# Patient Record
Sex: MALE | Race: WHITE | NOT HISPANIC OR LATINO | Employment: FULL TIME | URBAN - METROPOLITAN AREA
[De-identification: names, ages, dates, MRNs, and addresses within clinical notes are randomized per-mention and may not be internally consistent; named-entity substitution may affect disease eponyms.]

---

## 2017-03-12 ENCOUNTER — HOSPITAL ENCOUNTER (EMERGENCY)
Facility: OTHER | Age: 32
Discharge: HOME | End: 2017-03-12
Attending: EMERGENCY MEDICINE
Payer: COMMERCIAL

## 2017-03-12 VITALS
DIASTOLIC BLOOD PRESSURE: 67 MMHG | SYSTOLIC BLOOD PRESSURE: 113 MMHG | BODY MASS INDEX: 23.8 KG/M2 | TEMPERATURE: 98 F | WEIGHT: 170 LBS | HEIGHT: 71 IN | OXYGEN SATURATION: 97 % | HEART RATE: 82 BPM | RESPIRATION RATE: 12 BRPM

## 2017-03-12 DIAGNOSIS — R19.7 NAUSEA VOMITING AND DIARRHEA: Primary | ICD-10-CM

## 2017-03-12 DIAGNOSIS — R11.2 NAUSEA VOMITING AND DIARRHEA: Primary | ICD-10-CM

## 2017-03-12 PROCEDURE — 63600175 PHARM REV CODE 636 W HCPCS: Performed by: EMERGENCY MEDICINE

## 2017-03-12 PROCEDURE — 96374 THER/PROPH/DIAG INJ IV PUSH: CPT

## 2017-03-12 PROCEDURE — 96361 HYDRATE IV INFUSION ADD-ON: CPT

## 2017-03-12 PROCEDURE — 96375 TX/PRO/DX INJ NEW DRUG ADDON: CPT

## 2017-03-12 PROCEDURE — 25000003 PHARM REV CODE 250: Performed by: EMERGENCY MEDICINE

## 2017-03-12 PROCEDURE — 99284 EMERGENCY DEPT VISIT MOD MDM: CPT | Mod: 25

## 2017-03-12 RX ORDER — ONDANSETRON 4 MG/1
4 TABLET, ORALLY DISINTEGRATING ORAL EVERY 6 HOURS PRN
Qty: 12 TABLET | Refills: 0 | Status: SHIPPED | OUTPATIENT
Start: 2017-03-12

## 2017-03-12 RX ORDER — FAMOTIDINE 10 MG/ML
20 INJECTION INTRAVENOUS
Status: COMPLETED | OUTPATIENT
Start: 2017-03-12 | End: 2017-03-12

## 2017-03-12 RX ORDER — ONDANSETRON 2 MG/ML
4 INJECTION INTRAMUSCULAR; INTRAVENOUS
Status: COMPLETED | OUTPATIENT
Start: 2017-03-12 | End: 2017-03-12

## 2017-03-12 RX ADMIN — SODIUM CHLORIDE 1000 ML: 0.9 INJECTION, SOLUTION INTRAVENOUS at 08:03

## 2017-03-12 RX ADMIN — FAMOTIDINE 20 MG: 10 INJECTION INTRAVENOUS at 08:03

## 2017-03-12 RX ADMIN — ONDANSETRON 4 MG: 2 INJECTION, SOLUTION INTRAMUSCULAR; INTRAVENOUS at 08:03

## 2017-03-12 NOTE — ED AVS SNAPSHOT
[facility]  [address]  [facility] [address]               Adrian Ori   3/12/2017  7:17 AM   ED    Description:  Male : 1985   Department:  [facility]           Your Care was Coordinated By:     Provider Role From To    Bita Wagoner MD Attending Provider 17 0719 --      Reason for Visit     Abdominal Pain           Diagnoses this Visit        Comments    Nausea vomiting and diarrhea    -  Primary       ED Disposition     None           To Do List           Follow-up Information     Schedule an appointment as soon as possible for a visit with Cherie.    Specialties:  Behavioral Health, Psychiatry    Why:  As needed - or your regular primary doctor if you have one    Contact information:    [address]  [facility] [address]  [phone]          Follow up with [facility].    Specialty:  Emergency Medicine    Why:  As needed, If symptoms worsen    Contact information:    [address]  [facility] [address] [phone]       These Medications        Disp Refills Start End    ondansetron (ZOFRAN-ODT) 4 MG TbDL 12 tablet 0 3/12/2017     Take 1 tablet (4 mg total) by mouth every 6 (six) hours as needed (nausea). - Oral      [facility]     [facility] -  Assistance  Registered nurses in the [facility] provide clinical advisement, health education, appointment booking, and other advisory services.  Call for this free service at [phone].             Medications           Message regarding Medications     Verify the changes and/or additions to your medication regime listed below are the same as discussed with your clinician today.  If any of these changes or additions are incorrect, please notify your healthcare provider.        START taking these NEW medications        Refills    ondansetron (ZOFRAN-ODT) 4 MG TbDL 0    Sig: Take 1  "tablet (4 mg total) by mouth every 6 (six) hours as needed (nausea).    Class: Print    Route: Oral      These medications were administered today        Dose Freq    sodium chloride 0.9% bolus 1,000 mL 1,000 mL Once    Sig: Inject 1,000 mLs into the vein once.    Class: Normal    Route: Intravenous    ondansetron injection 4 mg 4 mg ED 1 Time    Sig: Inject 4 mg into the vein ED 1 Time.    Class: Normal    Route: Intravenous    famotidine (PF) 20 mg/2 mL injection 20 mg 20 mg ED 1 Time    Sig: Inject 2 mLs (20 mg total) into the vein ED 1 Time.    Class: Normal    Route: Intravenous           Verify that the below list of medications is an accurate representation of the medications you are currently taking.  If none reported, the list may be blank. If incorrect, please contact your healthcare provider. Carry this list with you in case of emergency.           Current Medications     ondansetron (ZOFRAN-ODT) 4 MG TbDL Take 1 tablet (4 mg total) by mouth every 6 (six) hours as needed (nausea).           Clinical Reference Information           Your Vitals Were     BP Pulse Temp Resp Height Weight    113/67 82 98.3 °F (36.8 °C) (Oral) 12 5' 11" (1.803 m) 77.1 kg (170 lb)    SpO2 BMI             97% 23.71 kg/m2         Allergies as of 3/12/2017        Reactions    Pcn [Penicillins] Hives    As child      Immunizations Administered on Date of Encounter - 3/12/2017     None      ED Micro, Lab, POCT     None      ED Imaging Orders     None      Discharge References/Attachments     VOMITING AND DIARRHEA, NONSPECIFIC (ADULT) (ENGLISH)    VOMITING AND DIARRHEA, SELF-CARE FOR (ENGLISH)       [facility] complies with applicable Federal civil rights laws and does not discriminate on the basis of race, color, national origin, age, disability, or sex.        Language Assistance Services     ATTENTION: Language assistance services are available, free of charge. Please call [phone].      ATENCIÓN: Si deisy " español, tiene a walls disposición servicios gratuitos de asistencia lingüística. Llame al [phone].     MARJ Ý: N?u b?n nói Ti?ng Vi?t, có các d?ch v? h? tr? ngôn ng? mi?n phí dành cho b?n. G?i s? [phone].

## 2017-03-12 NOTE — ED NOTES
"Pt reports eating bluebell ice cream last night, and started w/ nausea/vomiting at 10pm. Pt reports "throwing up all throughout the night," w/ stomach cramping. Currently pt denies nausea, reports mild cramping, states able to hold a sip of water down this AM. Pt states working at the [employer] last night, and his captain advised him to come in to get "checked out." Pt is AAO x 3, answers questions appropriately  "

## 2017-03-12 NOTE — ED PROVIDER NOTES
"Encounter Date: 3/12/2017    SCRIBE #1 NOTE: I, aMriel Marti, am scribing for, and in the presence of,  Dr. Wagoner. I have scribed the entire note.       History     Chief Complaint   Patient presents with    Abdominal Pain     Patient c/o generalized abdominal pain, nausea,vomiting & diarrhea since yesterday.  Patient stated, "I ate some bluebell ice cream and I think that was it b/c it was only my coworker and I that ate it and we both have the same symptoms."     Review of patient's allergies indicates:   Allergen Reactions    Pcn [penicillins] Hives     As child     HPI Comments: Time seen by provider: 7:42 AM    This is a 31 y.o. male who presents with complaint of nausea, vomiting, diarrhea and generalized abdominal pain. He reports onset of symptoms was about 9 hrs ago. The patient states the symptoms began a few hours after eating ice cream. He notes his coworker who also ate the ice cream felt bad as well but did not have any vomiting or diarrhea. The patient describes the pain as cramping and intermittent, mostly preceding vomiting episodes. He states there is associated nausea, vomiting and diarrhea but denies any constipation, urinary symptoms, fever or chills. The patient does note he had a few blood streaks in vomit but no blood present in diarrhea. He also reports a coworker noted he felt hot but did not check his temperature.    The history is provided by the patient.     History reviewed. No pertinent past medical history. no diabetes or hypertension.  History reviewed. No pertinent surgical history.  History reviewed. No pertinent family history.  Social History   Substance Use Topics    Smoking status: Never Smoker    Smokeless tobacco: None    Alcohol use Yes     Review of Systems   Constitutional: Negative for chills and fever.   HENT: Negative for congestion and sore throat.    Eyes: Negative for redness and visual disturbance.   Respiratory: Negative for cough and shortness of breath. "    Cardiovascular: Negative for chest pain and palpitations.   Gastrointestinal: Positive for abdominal pain, diarrhea, nausea and vomiting.   Genitourinary: Negative for dysuria.   Musculoskeletal: Negative for back pain.   Skin: Negative for rash.   Neurological: Negative for weakness and headaches.   Psychiatric/Behavioral: Negative for confusion.       Physical Exam   Initial Vitals   BP Pulse Resp Temp SpO2   03/12/17 0715 03/12/17 0715 03/12/17 0715 03/12/17 0715 03/12/17 0715   99/63 114 12 98.3 °F (36.8 °C) 97 %     Vitals:    03/12/17 0727 03/12/17 0738 03/12/17 0755 03/12/17 0825   BP:   121/69 112/67   Pulse: 100 96 96 86   Resp:       Temp:       TempSrc:       SpO2: 96% 96% 95% 96%   Weight:       Height:        03/12/17 0855   BP: 113/67   Pulse: 82   Resp:    Temp:    TempSrc:    SpO2: 97%   Weight:    Height:        Physical Exam    Nursing note and vitals reviewed.  Constitutional: He appears well-developed and well-nourished. He is not diaphoretic. No distress.   HENT:   Head: Normocephalic and atraumatic.   Right Ear: External ear normal.   Left Ear: External ear normal.   Eyes: Conjunctivae and EOM are normal. Pupils are equal, round, and reactive to light.   Neck: Normal range of motion. Neck supple.   Cardiovascular: Regular rhythm and normal heart sounds. Exam reveals no gallop and no friction rub.    No murmur heard.  Mild tachycardia   Pulmonary/Chest: Breath sounds normal. He has no wheezes. He has no rhonchi. He has no rales.   Abdominal: Soft. There is no tenderness. There is no rebound and no guarding.   Hyperactive bowel sounds   Musculoskeletal: Normal range of motion. He exhibits no edema or tenderness.   Lymphadenopathy:     He has no cervical adenopathy.   Neurological: He is alert and oriented to person, place, and time. He has normal strength.   Skin: Skin is warm and dry. No rash noted.         ED Course   Procedures  Labs Reviewed - No data to display          Medical Decision  Making:   ED Management:  9:06 AM Patient states he is feeling better, no further nausea or vomiting  Emergent evaluation a 31-year-old male with complaint of abdominal discomfort with associated vomiting and diarrhea since last night.  Initial vital signs revealed mild tachycardia, this had resolved by time of my exam.  There was no abdominal tenderness on exam and I doubt acute appendicitis or cholecystitis.  He was treated with IV fluids, Zofran and Pepcid with improvement in symptoms.  There was no further nausea or vomiting during ER course.  He is discharged in improved condition with prescription for Zofran, and I encouraged close follow-up with PCP.  I also advised him to return here for any new or worsening symptoms.  I do not think antibiotics are indicated at this time, nor do I think there is a Mayra-Allison tear or Boerhaave syndrome based on exam.            Scribe Attestation:   Scribe #1: I performed the above scribed service and the documentation accurately describes the services I performed. I attest to the accuracy of the note.    Attending Attestation:           Physician Attestation for Scribe:  Physician Attestation Statement for Scribe #1: I, Dr. Wagoner, reviewed documentation, as scribed by Mariel Marti in my presence, and it is both accurate and complete.                 ED Course     Clinical Impression:     1. Nausea vomiting and diarrhea                Bita Wagoner MD  03/12/17 0986

## 2020-05-25 ENCOUNTER — OFFICE VISIT (OUTPATIENT)
Dept: URGENT CARE | Facility: CLINIC | Age: 35
End: 2020-05-25
Payer: OTHER MISCELLANEOUS

## 2020-05-25 VITALS
HEIGHT: 71 IN | HEART RATE: 74 BPM | OXYGEN SATURATION: 99 % | RESPIRATION RATE: 20 BRPM | WEIGHT: 170 LBS | TEMPERATURE: 99 F | BODY MASS INDEX: 23.8 KG/M2

## 2020-05-25 DIAGNOSIS — S83.91XA SPRAIN OF RIGHT KNEE, UNSPECIFIED LIGAMENT, INITIAL ENCOUNTER: Primary | ICD-10-CM
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
## 2020-05-25 PROCEDURE — 99203 PR OFFICE/OUTPT VISIT, NEW, LEVL III, 30-44 MIN: ICD-10-PCS | Mod: S$GLB,,, | Performed by: PHYSICIAN ASSISTANT

## 2020-05-25 PROCEDURE — 73562 X-RAY EXAM OF KNEE 3: CPT | Mod: RT,S$GLB,, | Performed by: RADIOLOGY

## 2020-05-25 PROCEDURE — 73562 XR KNEE 3 VIEW RIGHT: ICD-10-PCS | Mod: RT,S$GLB,, | Performed by: RADIOLOGY

## 2020-05-25 PROCEDURE — 99203 OFFICE O/P NEW LOW 30 MIN: CPT | Mod: S$GLB,,, | Performed by: PHYSICIAN ASSISTANT

## 2020-05-25 RX ORDER — IBUPROFEN 600 MG/1
600 TABLET ORAL EVERY 6 HOURS PRN
Qty: 30 TABLET | Refills: 1 | Status: SHIPPED | OUTPATIENT
Start: 2020-05-25 | End: 2020-06-24

## 2020-05-25 NOTE — PROGRESS NOTES
"Subjective:       Patient ID: Adrian Rehman is a 34 y.o. male.    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   He did continue to work for the rest the shift.  Since then he has had intermittent pain in the right knee.  He states the pain is mostly in the medial and posterior aspect of the knee.  Patient states he feels as if the knee "locks up".  He says he did feel like it would give out today but he shifted is weight to his of leg before that happened.  Patient states he did have an injury to this knee few years ago require physical therapy but no surgery.    Knee Injury   This is a new problem. The current episode started in the past 7 days. The problem occurs constantly. The problem has been gradually worsening. Associated symptoms include numbness. Pertinent negatives include no abdominal pain, anorexia, arthralgias, change in bowel habit, chest pain, chills, congestion, coughing, diaphoresis, fatigue, fever, headaches, joint swelling, myalgias, nausea, neck pain, rash, sore throat, swollen glands or vertigo. The symptoms are aggravated by bending, standing and walking. He has tried ice and immobilization for the symptoms. The treatment provided no relief.       Constitution: Negative for chills, sweating, fatigue and fever.   HENT: Negative for facial swelling, facial trauma, congestion and sore throat.    Neck: Negative for neck pain and neck stiffness.   Cardiovascular: Negative for chest trauma and chest pain.   Eyes: Negative for eye trauma, double vision and blurred vision.   Respiratory: Negative for cough.    Gastrointestinal: Negative for abdominal trauma, abdominal pain, nausea and rectal bleeding.   Genitourinary: Negative for hematuria, genital trauma and pelvic pain.   Musculoskeletal: Negative for pain, trauma, joint pain, joint swelling, abnormal ROM of joint, pain with " walking and muscle ache.   Skin: Negative for color change, rash, wound, abrasion, laceration and erythema.   Neurological: Positive for numbness. Negative for dizziness, history of vertigo, light-headedness, coordination disturbances, headaches, altered mental status and loss of consciousness.   Hematologic/Lymphatic: Negative for history of bleeding disorder.   Psychiatric/Behavioral: Negative for altered mental status.        Objective:      Physical Exam   Constitutional: He is oriented to person, place, and time. He appears well-developed and well-nourished. No distress.   HENT:   Head: Normocephalic and atraumatic.   Eyes: Conjunctivae are normal.   Neck: Normal range of motion. Neck supple.   Cardiovascular: Normal rate and regular rhythm. Exam reveals no gallop and no friction rub.   No murmur heard.  Pulmonary/Chest: Effort normal and breath sounds normal. He has no wheezes. He has no rales.   Musculoskeletal:        Right knee: He exhibits decreased range of motion and swelling. Tenderness found. Medial joint line tenderness noted. No lateral joint line, no MCL, no LCL and no patellar tendon tenderness noted.   Right knee:  He is able to extend the knee fully.  He is able to flex to approximately 90°.   Negative anterior posterior drawer test.  Negative valgus and varus stress test.   Neurological: He is alert and oriented to person, place, and time.   Skin: Skin is warm and dry. No rash noted. No erythema.   Psychiatric: He has a normal mood and affect. His behavior is normal. Judgment and thought content normal.   Nursing note and vitals reviewed.      Xr Knee 3 View Right    Result Date: 5/25/2020  EXAMINATION: XR KNEE 3 VIEW RIGHT CLINICAL HISTORY: Unspecified injury of right lower leg, initial encounter FINDINGS: Three views: No fracture dislocation bone destruction seen. Electronically signed by: Thom Cooper MD Date:    05/25/2020 Time:    16:37    Assessment:       1. Sprain of right knee,  unspecified ligament, initial encounter    2. Work related injury        Plan:         Medications Ordered This Encounter   Medications    ibuprofen (ADVIL,MOTRIN) 600 MG tablet     Sig: Take 1 tablet (600 mg total) by mouth every 6 (six) hours as needed.     Dispense:  30 tablet     Refill:  1     Patient Instructions: Attention not to aggravate affected area, Apply ice 24-48 hours then apply heat/warm soaks, Elevated affected area(Take Tylenol as directed as needed for pain.  Take prescription ibuprofen as directed as needed for pain.)   Restrictions: Sit down work only  Follow up in about 1 week (around 6/1/2020).

## 2020-05-25 NOTE — PATIENT INSTRUCTIONS
Knee Sprain    A sprain is an injury to the ligaments or capsule that holds a joint together. There are no broken bones. Most sprains take 3 to 6 weeks to heal. If it a severe sprain where the ligament is completely torn, it can take months to recover.  Most knee sprains are treated with a splint, knee immobilizer brace, or elastic wrap for support. Severe sprains may require surgery.  Home care  · Stay off the injured leg as much as possible until you can walk on it without pain. If you have a lot of pain with walking, crutches or a walker may be prescribed. (These can be rented or purchased at many pharmacies and surgical or orthopedic supply stores). Follow your healthcare provider's advice about when to begin putting weight on that leg.  · Keep your leg elevated to reduce pain and swelling. When sleeping, place a pillow under the injured leg. When sitting, support the injured leg so it is level with your waist. This is very important during the first 48 hours.  · Apply an ice pack over the injured area for 15 to 20 minutes every 3 to 6 hours. You should do this for the first 24 to 48 hours. You can make an ice pack by filling a plastic bag that seals at the top with ice cubes and then wrapping it with a thin towel. Continue to use ice packs for relief of pain and swelling as needed. As the ice melts, be careful to avoid getting your wrap, splint, or cast wet. After 48 hours, apply heat (warm shower or warm bath) for 15 to 20 minutes several times a day, or alternate ice and heat. You can place the ice pack directly over the splint. If you have to wear a hook-and-loop knee brace, you can open it to apply the ice pack, or heat, directly to the knee. Never put ice directly on the skin. Always wrap the ice in a towel or other type of cloth.  · You may use over-the-counter pain medicine to control pain, unless another pain medicine was prescribed.If you have chronic liver or kidney disease or ever had a stomach  ulcer or GI bleeding, talk with your healthcare provider before using these medicines.  · If you were given a splint, keep it completely dry at all times. Bathe with your splint out of the water, protected with 2 large plastic bags, rubber-banded at the top end. If a fiberglass splint gets wet, you can dry it with a hair dryer. If you have a hook-and-loop knee brace, you can remove this to bathe, unless told otherwise.  Follow-up care  Follow up with your doctor as advised. Any X-rays you had today dont show any broken bones, breaks, or fractures. Sometimes fractures dont show up on the first X-ray. Bruises and sprains can sometimes hurt as much as a fracture. These injuries can take time to heal completely. If your symptoms dont improve or they get worse, talk with your doctor. You may need a repeat X-ray. If X-rays were taken, you will be told of any new findings that may affect your care.  Call 911  Call 911 if you have:  ·  Shortness of breath  ·  Chest pain  When to seek medical advice  Call your healthcare provider right away if any of these occur:  · The splint or knee immobilizer brace becomes wet or soft  · The fiberglass cast or splint remains wet for more than 24 hours  · Pain or swelling increases  · The injured leg or toes become cold, blue, numb, or tingly  Date Last Reviewed: 11/20/2015  © 7107-9514 [employer]. [address] [place] [address], [place] [address]. All rights reserved. This information is not intended as a substitute for professional medical care. Always follow your healthcare professional's instructions.

## 2020-05-25 NOTE — LETTER
[facility] [address] [address]  Phone: [phone]  Fax: [phone]  [facility]: [id number]    Pt Name: Adrian Rehman  Injury Date: 05/22/2020   Employee ID:  Date of First Treatment: 05/25/2020   Company: [email or web address] [facility] [employer]      Appointment Time: 03:55 PM Arrived: 3:55 PM   Provider: Trang Kay PA-C Time Out: 4:45 PM      Office Treatment:   1. Sprain of right knee, unspecified ligament, initial encounter    2. Work related injury          Patient Instructions: Attention not to aggravate affected area, Apply ice 24-48 hours then apply heat/warm soaks, Elevated affected area(Take Tylenol as directed as needed for pain.  Take prescription ibuprofen as directed as needed for pain.)    Restrictions: Sit down work only     Return Appointment: 6/1/2020 at 3:30 PM

## 2020-06-01 ENCOUNTER — OFFICE VISIT (OUTPATIENT)
Dept: URGENT CARE | Facility: CLINIC | Age: 35
End: 2020-06-01
Payer: OTHER MISCELLANEOUS

## 2020-06-01 VITALS
BODY MASS INDEX: 23.8 KG/M2 | RESPIRATION RATE: 18 BRPM | DIASTOLIC BLOOD PRESSURE: 84 MMHG | SYSTOLIC BLOOD PRESSURE: 120 MMHG | WEIGHT: 170 LBS | TEMPERATURE: 100 F | OXYGEN SATURATION: 97 % | HEART RATE: 88 BPM | HEIGHT: 71 IN
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
## 2020-06-01 DIAGNOSIS — S83.91XD SPRAIN OF RIGHT KNEE, UNSPECIFIED LIGAMENT, SUBSEQUENT ENCOUNTER: ICD-10-CM

## 2020-06-01 PROCEDURE — 99213 PR OFFICE/OUTPT VISIT, EST, LEVL III, 20-29 MIN: ICD-10-PCS | Mod: S$GLB,,, | Performed by: NURSE PRACTITIONER

## 2020-06-01 PROCEDURE — 99213 OFFICE O/P EST LOW 20 MIN: CPT | Mod: S$GLB,,, | Performed by: NURSE PRACTITIONER

## 2020-06-01 RX ORDER — DEXTROMETHORPHAN HYDROBROMIDE, GUAIFENESIN 5; 100 MG/5ML; MG/5ML
650 LIQUID ORAL EVERY 8 HOURS
Refills: 0 | COMMUNITY
Start: 2020-06-01

## 2020-06-01 NOTE — PROGRESS NOTES
"Subjective:       Patient ID: Adrian Rehman is a 34 y.o. male.    Vitals:  height is 5' 11" (1.803 m) and weight is 77.1 kg (170 lb). His temperature is 99.6 °F (37.6 °C). His blood pressure is 120/84 and his pulse is 88. His respiration is 18 and oxygen saturation is 97%.     Chief Complaint: Knee Pain    F/u on work injury. RT knee.  States that hurts with lateral movement and occasionally when going up and down the stairs in the medial aspect of the knee    Knee Pain    The incident occurred more than 1 week ago (10 days). The incident occurred at work. The injury mechanism is unknown. The pain is present in the right knee. The quality of the pain is described as aching. The pain is at a severity of 2/10. The pain is mild. The pain has been fluctuating since onset. The symptoms are aggravated by weight bearing.       Constitution: Negative for chills, fatigue and fever.   HENT: Negative for congestion and sore throat.    Neck: Negative for painful lymph nodes.   Cardiovascular: Negative for chest pain and leg swelling.   Eyes: Negative for double vision and blurred vision.   Respiratory: Negative for cough and shortness of breath.    Gastrointestinal: Negative for nausea, vomiting and diarrhea.   Genitourinary: Negative for dysuria, frequency and urgency.   Musculoskeletal: Positive for joint pain. Negative for joint swelling, muscle cramps and muscle ache.   Skin: Negative for color change, pale, rash and erythema.   Allergic/Immunologic: Negative for seasonal allergies.   Neurological: Negative for dizziness, history of vertigo, light-headedness, passing out and headaches.   Hematologic/Lymphatic: Negative for swollen lymph nodes, easy bruising/bleeding and history of blood clots. Does not bruise/bleed easily.   Psychiatric/Behavioral: Negative for nervous/anxious, sleep disturbance and depression. The patient is not nervous/anxious.        Objective:      Physical Exam   Constitutional: He is oriented to person, " place, and time. He appears well-developed and well-nourished. No distress.   HENT:   Head: Normocephalic and atraumatic.   Eyes: Conjunctivae are normal.   Neck: Normal range of motion. Neck supple.   Cardiovascular: Normal rate and regular rhythm. Exam reveals no gallop and no friction rub.   No murmur heard.  Pulmonary/Chest: Effort normal and breath sounds normal. He has no wheezes. He has no rales.   Musculoskeletal:        Right knee: He exhibits normal range of motion and no swelling. No tenderness found. No medial joint line, no lateral joint line, no MCL, no LCL and no patellar tendon tenderness noted.   Right knee:  He is able to extend the knee fully.  He is able to flex to approximately 90°.   Negative anterior posterior drawer test.  Negative valgus and varus stress test.   Neurological: He is alert and oriented to person, place, and time.   Skin: Skin is warm, dry and no rash. erythema  Psychiatric: He has a normal mood and affect. His behavior is normal. Judgment and thought content normal.   Nursing note and vitals reviewed.        Assessment:       1. Work related injury    2. Sprain of right knee, unspecified ligament, subsequent encounter        Plan:         Work related injury    Sprain of right knee, unspecified ligament, subsequent encounter    Other orders  -     acetaminophen (TYLENOL) 650 MG TbSR; Take 1 tablet (650 mg total) by mouth every 8 (eight) hours.; Refill: 0         Patient Instructions: Attention not to aggravate affected area, Use splint as directed    Restrictions: Sit down work only(06/01/2020)     Return Appointment: 06/08/2020 at 1130am

## 2020-06-01 NOTE — LETTER
[facility] [address] [address]  Phone: [phone]  Fax: [phone]  [facility]: [email or web address]    Pt Name: Adrian Rehman  Injury Date: 05/22/2020    Date of First Treatment: 06/01/2020   Company: [employer]      Appointment Time: 03:15 PM Arrived: 315pm   Provider: Yamilet Escalera NP Time Out:4pm     Office Treatment:   1. Work related injury    2. Sprain of right knee, unspecified ligament, subsequent encounter      Medications Ordered This Encounter   Medications    acetaminophen (TYLENOL) 650 MG TbSR      Patient Instructions: Attention not to aggravate affected area, Use splint as directed    Restrictions: Sit down work only(06/01/2020)     Return Appointment: 06/08/2020 at 1130am

## 2020-06-08 ENCOUNTER — OFFICE VISIT (OUTPATIENT)
Dept: URGENT CARE | Facility: CLINIC | Age: 35
End: 2020-06-08
Payer: OTHER MISCELLANEOUS

## 2020-06-08 VITALS
HEART RATE: 64 BPM | RESPIRATION RATE: 16 BRPM | DIASTOLIC BLOOD PRESSURE: 78 MMHG | TEMPERATURE: 98 F | SYSTOLIC BLOOD PRESSURE: 136 MMHG | OXYGEN SATURATION: 98 %
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
## 2020-06-08 DIAGNOSIS — S83.91XD SPRAIN OF RIGHT KNEE, UNSPECIFIED LIGAMENT, SUBSEQUENT ENCOUNTER: ICD-10-CM

## 2020-06-08 PROCEDURE — 99213 PR OFFICE/OUTPT VISIT, EST, LEVL III, 20-29 MIN: ICD-10-PCS | Mod: S$GLB,,, | Performed by: NURSE PRACTITIONER

## 2020-06-08 PROCEDURE — 99213 OFFICE O/P EST LOW 20 MIN: CPT | Mod: S$GLB,,, | Performed by: NURSE PRACTITIONER

## 2020-06-08 NOTE — PROGRESS NOTES
Subjective:       Patient ID: Adrian Rehman is a 34 y.o. male.    Chief Complaint: Follow-up    F/U on work injury.  States that his knee feels better and he is ready to return to regular duty.      Constitution: Negative for chills, fatigue and fever.   HENT: Negative for congestion and sore throat.    Neck: Negative for painful lymph nodes.   Cardiovascular: Negative for chest pain and leg swelling.   Eyes: Negative for double vision and blurred vision.   Respiratory: Negative for cough and shortness of breath.    Gastrointestinal: Negative for nausea, vomiting and diarrhea.   Genitourinary: Negative for dysuria, frequency and urgency.   Musculoskeletal: Negative for joint pain, joint swelling, muscle cramps and muscle ache.   Skin: Negative for color change, pale, rash and erythema.   Allergic/Immunologic: Negative for seasonal allergies.   Neurological: Negative for dizziness, history of vertigo, light-headedness, passing out and headaches.   Hematologic/Lymphatic: Negative for swollen lymph nodes, easy bruising/bleeding and history of blood clots. Does not bruise/bleed easily.   Psychiatric/Behavioral: Negative for nervous/anxious, sleep disturbance and depression. The patient is not nervous/anxious.         Objective:      Physical Exam   Constitutional: He is oriented to person, place, and time. He appears well-developed and well-nourished. No distress.   HENT:   Head: Normocephalic and atraumatic.   Eyes: Conjunctivae are normal.   Neck: Normal range of motion. Neck supple.   Cardiovascular: Normal rate and regular rhythm. Exam reveals no gallop and no friction rub.   No murmur heard.  Pulmonary/Chest: Effort normal and breath sounds normal. He has no wheezes. He has no rales.   Musculoskeletal: Normal range of motion. He exhibits no tenderness.        Right knee: He exhibits normal range of motion and no swelling. No tenderness found. No medial joint line, no lateral joint line, no MCL, no LCL and no patellar  tendon tenderness noted.   Right knee:  He is able to extend the knee fully.  He is able to flex to approximately 90°.   Negative anterior posterior drawer test.  Negative valgus and varus stress test.   Neurological: He is alert and oriented to person, place, and time.   Skin: Skin is warm and dry. No rash noted. No erythema.   Psychiatric: He has a normal mood and affect. His behavior is normal. Judgment and thought content normal.   Nursing note and vitals reviewed.      Assessment:       1. Work related injury    2. Sprain of right knee, unspecified ligament, subsequent encounter        Plan:                Restrictions: Regular Duty, Discharged from Occupational Health(06/08/2020)  Follow up if symptoms worsen or fail to improve.

## 2020-07-17 ENCOUNTER — HOSPITAL ENCOUNTER (OUTPATIENT)
Dept: RADIOLOGY | Facility: OTHER | Age: 35
Discharge: HOME | End: 2020-07-17
Attending: NURSE PRACTITIONER
Payer: OTHER MISCELLANEOUS

## 2020-07-17 ENCOUNTER — OFFICE VISIT (OUTPATIENT)
Dept: URGENT CARE | Facility: CLINIC | Age: 35
End: 2020-07-17
Payer: OTHER MISCELLANEOUS

## 2020-07-17 VITALS
OXYGEN SATURATION: 97 % | TEMPERATURE: 96 F | BODY MASS INDEX: 23.8 KG/M2 | DIASTOLIC BLOOD PRESSURE: 71 MMHG | SYSTOLIC BLOOD PRESSURE: 111 MMHG | RESPIRATION RATE: 19 BRPM | HEIGHT: 71 IN | HEART RATE: 80 BPM | WEIGHT: 170 LBS
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
## 2020-07-17 DIAGNOSIS — S83.91XD SPRAIN OF RIGHT KNEE, UNSPECIFIED LIGAMENT, SUBSEQUENT ENCOUNTER: ICD-10-CM

## 2020-07-17 DIAGNOSIS — M25.561 RIGHT KNEE PAIN, UNSPECIFIED CHRONICITY: ICD-10-CM

## 2020-07-17 PROCEDURE — 73721 MRI JNT OF LWR EXTRE W/O DYE: CPT | Mod: TC,RT

## 2020-07-17 PROCEDURE — 73721 MRI JNT OF LWR EXTRE W/O DYE: CPT | Mod: 26,RT,, | Performed by: RADIOLOGY

## 2020-07-17 PROCEDURE — 99213 PR OFFICE/OUTPT VISIT, EST, LEVL III, 20-29 MIN: ICD-10-PCS | Mod: S$GLB,,, | Performed by: NURSE PRACTITIONER

## 2020-07-17 PROCEDURE — 73721 MRI KNEE WITHOUT CONTRAST RIGHT: ICD-10-PCS | Mod: 26,RT,, | Performed by: RADIOLOGY

## 2020-07-17 PROCEDURE — 99213 OFFICE O/P EST LOW 20 MIN: CPT | Mod: S$GLB,,, | Performed by: NURSE PRACTITIONER

## 2020-07-17 NOTE — LETTER
[facility] [address] [address]  Phone: [phone]  Fax: [phone]  [facility]: [id number]    Pt Name: Adrian Rehman  Injury Date: 05/22/2020    Date of First Treatment: 07/17/2020   Company: [employer]      Appointment Time: 07:45 AM Arrived: 815am   Provider: Yamilet Escalera NP Time Out:9am     Office Treatment:   1. Work related injury    2. Sprain of right knee, unspecified ligament, subsequent encounter    3. Right knee pain, unspecified chronicity          Patient Instructions: MRI to be scheduled once authorized(You may take Tylenol or ibuprofen for pain and discomfort)    Restrictions: Regular Duty     Return Appointment: 07/24/2020 at 830am

## 2020-07-17 NOTE — PROGRESS NOTES
"Subjective:       Patient ID: Adrian Rehman is a 34 y.o. male.    Vitals:  height is 5' 11" (1.803 m) and weight is 77.1 kg (170 lb). His temperature is 96.2 °F (35.7 °C). His blood pressure is 111/71 and his pulse is 80. His respiration is 19 and oxygen saturation is 97%.     Chief Complaint: Work Related Injury    Pt states after returning back to work thinking his knee was better, it began having pain again. He hasnt used any compression or taken anything since he returned to work. States it clicks and pops occasionally and is becoming more frequent      Constitution: Negative for chills, fatigue and fever.   HENT: Negative for congestion and sore throat.    Neck: Negative for painful lymph nodes.   Cardiovascular: Negative for chest pain and leg swelling.   Eyes: Negative for double vision and blurred vision.   Respiratory: Negative for cough and shortness of breath.    Gastrointestinal: Negative for nausea, vomiting and diarrhea.   Genitourinary: Negative for dysuria, frequency and urgency.   Musculoskeletal: Positive for joint pain. Negative for joint swelling, muscle cramps and muscle ache.   Skin: Negative for color change, pale, rash and erythema.   Allergic/Immunologic: Negative for seasonal allergies.   Neurological: Negative for dizziness, history of vertigo, light-headedness, passing out and headaches.   Hematologic/Lymphatic: Negative for swollen lymph nodes, easy bruising/bleeding and history of blood clots. Does not bruise/bleed easily.   Psychiatric/Behavioral: Negative for nervous/anxious, sleep disturbance and depression. The patient is not nervous/anxious.        Objective:      Physical Exam   Constitutional: He is oriented to person, place, and time. He appears well-developed. No distress.   HENT:   Head: Normocephalic and atraumatic.   Eyes: Conjunctivae are normal.   Neck: Normal range of motion. Neck supple.   Cardiovascular: Normal rate and regular rhythm. Exam reveals no gallop and no " friction rub.   No murmur heard.  Pulmonary/Chest: Effort normal and breath sounds normal. He has no wheezes. He has no rales.   Musculoskeletal: Normal range of motion.         General: No swelling or tenderness.      Right knee: He exhibits normal range of motion and no swelling. No tenderness found. No medial joint line, no lateral joint line, no MCL, no LCL and no patellar tendon tenderness noted.      Comments: Right knee:  He is able to extend the knee fully but stiffness is noted.  He is able to flex to approximately 90°.   Negative anterior posterior drawer test.  Negative valgus and varus stress test.   Neurological: He is alert and oriented to person, place, and time.   Skin: Skin is warm, dry and no rash. erythemaPsychiatric: His behavior is normal. Judgment and thought content normal.   Nursing note and vitals reviewed.        Assessment:       1. Work related injury    2. Sprain of right knee, unspecified ligament, subsequent encounter    3. Right knee pain, unspecified chronicity        Plan:         Work related injury    Sprain of right knee, unspecified ligament, subsequent encounter    Right knee pain, unspecified chronicity  -     MRI Knee Without Contrast Right; Future; Expected date: 07/17/2020          Patient Instructions: MRI to be scheduled once authorized(You may take Tylenol or ibuprofen for pain and discomfort)    Restrictions: Regular Duty     Return Appointment: 07/24/2020 at 830am

## 2020-07-24 ENCOUNTER — OFFICE VISIT (OUTPATIENT)
Dept: URGENT CARE | Facility: CLINIC | Age: 35
End: 2020-07-24
Payer: OTHER MISCELLANEOUS

## 2020-07-24 ENCOUNTER — TELEPHONE (OUTPATIENT)
Dept: URGENT CARE | Facility: CLINIC | Age: 35
End: 2020-07-24

## 2020-07-24 VITALS
RESPIRATION RATE: 16 BRPM | DIASTOLIC BLOOD PRESSURE: 74 MMHG | HEART RATE: 94 BPM | BODY MASS INDEX: 23.8 KG/M2 | OXYGEN SATURATION: 98 % | WEIGHT: 170 LBS | TEMPERATURE: 98 F | HEIGHT: 71 IN | SYSTOLIC BLOOD PRESSURE: 106 MMHG

## 2020-07-24 DIAGNOSIS — M94.261 CHONDROMALACIA OF KNEE, RIGHT: ICD-10-CM

## 2020-07-24 DIAGNOSIS — M25.561 ACUTE PAIN OF RIGHT KNEE: ICD-10-CM

## 2020-07-24 DIAGNOSIS — S83.91XD SPRAIN OF RIGHT KNEE, UNSPECIFIED LIGAMENT, SUBSEQUENT ENCOUNTER: Primary | ICD-10-CM

## 2020-07-24 PROCEDURE — 99214 PR OFFICE/OUTPT VISIT, EST, LEVL IV, 30-39 MIN: ICD-10-PCS | Mod: S$GLB,,, | Performed by: PHYSICIAN ASSISTANT

## 2020-07-24 PROCEDURE — 99214 OFFICE O/P EST MOD 30 MIN: CPT | Mod: S$GLB,,, | Performed by: PHYSICIAN ASSISTANT

## 2020-07-24 NOTE — TELEPHONE ENCOUNTER
Voice message left with  Luis Felipe Roland regarding claim status.  Patients MRI has been denied and questioned as to the claim being denied or just the MRI.

## 2020-07-24 NOTE — LETTER
[facility] [address] [address]  Phone: [phone]  Fax: [phone]  [facility]: [email or web address]    Pt Name: Adrian Rehman  Injury Date: 05/22/2020   Employee ID:  Date of First Treatment: 07/24/2020   Company: [id number] [facility] [employer]      Appointment Time: 07:50 AM Arrived: 08:02 AM   Provider: Coy Pimentel PA-C Time Out: 08:40 AM     Office Treatment:   1. Sprain of right knee, unspecified ligament, subsequent encounter    2. Acute pain of right knee    3. Chondromalacia of knee, right          Patient Instructions: PT to be scheduled once authorized     Call [phone] and ask for Homero Michael if PT has not contacted you in 1 week.      Restrictions: Regular Duty     Return Appointment: Friday 8/14/2020 at 08:00 AM

## 2020-07-24 NOTE — PROGRESS NOTES
"Subjective:       Patient ID: Adrian Rehman is a 34 y.o. male.    Vitals:  height is 5' 11" (1.803 m) and weight is 77.1 kg (170 lb). His temperature is 98.2 °F (36.8 °C). His blood pressure is 106/74 and his pulse is 94. His respiration is 16 and oxygen saturation is 98%.     Chief Complaint: Follow-up    Pt f/u on work injury on RT Knee. Pt had MRI completed. Pt reports daily episodes of right knee almost "giving out". Pt reports weakness of RLE. Pt denies falling to the ground, but states shifts his weight to the LLE when he feels the knee giving out. Pt reports intermittent pain in the right knee. Pt is not taking anything for pain.     Follow-up  This is a new problem. The current episode started 1 to 4 weeks ago. The problem has been unchanged. Associated symptoms include arthralgias. Pertinent negatives include no chest pain, chills, congestion, coughing, fatigue, fever, headaches, joint swelling, myalgias, nausea, rash, sore throat, vertigo or vomiting. The symptoms are aggravated by walking. He has tried nothing for the symptoms.       Constitution: Negative for chills, fatigue and fever.   HENT: Negative for congestion and sore throat.    Neck: Negative for painful lymph nodes.   Cardiovascular: Negative for chest pain and leg swelling.   Eyes: Negative for double vision and blurred vision.   Respiratory: Negative for cough and shortness of breath.    Gastrointestinal: Negative for nausea, vomiting and diarrhea.   Genitourinary: Negative for dysuria, frequency and urgency.   Musculoskeletal: Positive for joint pain. Negative for joint swelling, abnormal ROM of joint, muscle cramps and muscle ache.   Skin: Negative for color change, pale and rash.   Allergic/Immunologic: Negative for seasonal allergies.   Neurological: Negative for dizziness, history of vertigo, light-headedness, passing out and headaches.   Hematologic/Lymphatic: Negative for swollen lymph nodes, easy bruising/bleeding and history of blood " clots. Does not bruise/bleed easily.   Psychiatric/Behavioral: Negative for nervous/anxious, sleep disturbance and depression. The patient is not nervous/anxious.        Objective:      Physical Exam   Constitutional: He is oriented to person, place, and time. He appears well-developed. He is cooperative. No distress.   HENT:   Head: Normocephalic and atraumatic.   Nose: Nose normal.   Mouth/Throat: Oropharynx is clear and moist and mucous membranes are normal.   Eyes: Conjunctivae and lids are normal.   Neck: Trachea normal, normal range of motion, full passive range of motion without pain and phonation normal. Neck supple.   Cardiovascular: Normal rate, regular rhythm, normal heart sounds and normal pulses.   Pulmonary/Chest: Effort normal and breath sounds normal.   Abdominal: Soft. Normal appearance and bowel sounds are normal. He exhibits no abdominal bruit, no pulsatile midline mass and no mass.   Musculoskeletal:         General: No deformity.      Right knee: He exhibits normal range of motion, no swelling, no effusion, no ecchymosis, no deformity, no laceration, no erythema, normal alignment, no LCL laxity, no bony tenderness, normal meniscus (Geronimo negative) and no MCL laxity. No tenderness found.   Neurological: He is alert and oriented to person, place, and time. He has normal strength and normal reflexes. No sensory deficit.   Skin: Skin is warm, dry, intact and not diaphoretic. not right kneePsychiatric: His speech is normal and behavior is normal. Judgment and thought content normal.   Nursing note and vitals reviewed.      Mri Knee Without Contrast Right    Result Date: 7/17/2020  EXAMINATION: MRI KNEE WITHOUT CONTRAST RIGHT CLINICAL HISTORY: Knee pain, persistent, > 6wks of conservative treatment;Pain in right knee TECHNIQUE: Multiplanar, multisequence images were performed of the right knee. COMPARISON: None FINDINGS: Menisci:Medial and lateral menisci are normal. Ligaments:The ACL, PCL, MCL,  and lateral collateral ligament complexes are intact. Extensor Mechanism:Quadriceps and patellar tendons are intact. Bone and Joint including articular cartilage:Bone marrow signal is normal.  There is a focal area of abnormal signal and near complete articular cartilage loss involving the articular cartilage of the medial trochlear surface of the femur measuring approximately 5 mm associated with minimal subchondral edema.  Articular cartilage thinning and surface irregularity seen over a 12 mm section of the posterior weight-bearing medial femoral condyle.  Articular cartilage otherwise fairly well preserved. Soft Tissues:Unremarkable. Miscellaneous:None     Areas of grade 3-4 chondromalacia involving the trochlear surface of the femur and medial femoral condyle as discussed above.  Otherwise unremarkable. Electronically signed by: Lasha Sherman Jr Date:    07/17/2020 Time:    13:05        Assessment:       1. Sprain of right knee, unspecified ligament, subsequent encounter    2. Acute pain of right knee    3. Chondromalacia of knee, right        Plan:         Sprain of right knee, unspecified ligament, subsequent encounter  -     Ambulatory referral/consult to Physical/Occupational Therapy    Acute pain of right knee    Chondromalacia of knee, right    Regular duty.     F/U 3 weeks 8/14/2020

## 2021-04-28 ENCOUNTER — PATIENT MESSAGE (OUTPATIENT)
Dept: RESEARCH | Facility: HOSPITAL | Age: 36
End: 2021-04-28